# Patient Record
Sex: FEMALE | Race: WHITE
[De-identification: names, ages, dates, MRNs, and addresses within clinical notes are randomized per-mention and may not be internally consistent; named-entity substitution may affect disease eponyms.]

---

## 2017-03-13 ENCOUNTER — HOSPITAL ENCOUNTER (EMERGENCY)
Dept: HOSPITAL 80 - CED | Age: 82
Discharge: HOME | End: 2017-03-13
Payer: COMMERCIAL

## 2017-03-13 VITALS
RESPIRATION RATE: 18 BRPM | TEMPERATURE: 98.1 F | OXYGEN SATURATION: 98 % | SYSTOLIC BLOOD PRESSURE: 166 MMHG | HEART RATE: 70 BPM | DIASTOLIC BLOOD PRESSURE: 69 MMHG

## 2017-03-13 DIAGNOSIS — M75.31: ICD-10-CM
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
## 2017-03-13 DIAGNOSIS — S46.911A: Primary | ICD-10-CM

## 2017-03-13 PROCEDURE — 73030 X-RAY EXAM OF SHOULDER: CPT

## 2017-03-13 NOTE — UCPHY
H & P


Patient Type: Established


HPI/ROS: 





HPI





CHIEF COMPLAINT:  Right shoulder pain





HISTORY OF PRESENT ILLNESS:  this patient very pleasant 85-year-old female 

significant past medical history for atrial fibrillation, pacemaker, chronic 

kidney disease, hypertension, wheelchair bound due to his severe significant 

arthritis, generalized weakness, presents to the urgent care with right 

shoulder pain she tells me about 2 weeks ago she over extended her right arm.  

Her arm was pulled backwards.  Since then she has had intermittent right 

shoulder pain. At times it gets better but however at times gets worse.  She 

tells me the past 24 hours she has anterior right shoulder pain that is worse.  

She thinks she may have re-injured it.  She is able to adduct it however has 

trouble when she abducts it.  This patient tells me that she does have 

available oxycodone however is not take anything for pain. She cannot take 

NSAIDs due to underlying kidney disease.





Past Medical History:  Hypertension, AFib, pacemaker, severe arthritis, 

sedentary, wheelchair-bound, chronic kidney disease, hypertension on Coumadin





Past Surgical History:  Bilateral knee replacements, hip surgery, pacemaker





Social History:   denies use of drugs alcohol tobacco products.  Wheelchair 

bound.   at bedside.





Family History:  Noncontributory








ROS   


REVIEW OF SYSTEMS:


A comprehensive 10 point review of systems is otherwise negative aside from 

elements mentioned in the history of present illness.








Exam   


Constitutional   triage nursing summary reviewed, vital signs reviewed, awake/

alert. 


Eyes   normal conjunctivae and sclera, EOMI, PERRLA. 


HENT   normal inspection, atraumatic, moist mucus membranes, no epistaxis, neck 

supple/ no meningismus, no raccoon eyes. 


Respiratory   clear to auscultation bilaterally, normal breath sounds, no 

respiratory distress, no wheezing. 


Cardiovascular   rate normal, regular rhythm, no murmur, no edema, distal 

pulses normal. 


Gastrointestinal   soft, non-tender, no rebound, no guarding, normal bowel 

sounds, no distension, no pulsatile mass. 


Genitourinary   no CVA tenderness. 


Musculoskeletal on exam of the right shoulder she distally neurovascular intact 

good  strength, good cap refill, good pulse, good radial pulse, able to  

ADDuct her shoulder, however with abduction she develops anterior right 

shoulder pain consistent with musculoskeletal injury possible rotator cuff 

injury, versus deltoid tear, no midline vertebral tenderness, full range of 

motion, no calf swelling, no tenderness of extremities, no meningismus, good 

pulses, neurovascularly intact.


Skin   pink, warm, & dry, no rash, skin atraumatic. 


Neurologic   awake, alert and oriented x 3, AAOx3, moves all 4 extremities 

equally, motor intact, sensory intact, CN II-XII intact, normal cerebellar, 

normal vision, normal speech. 


Psychiatric   normal mood/affect. 


Heme/Lymph/Immune   no lymphadenopathy.





Differential Diagnosis: includes but is not limited to in a particular, 

shoulder strain, musculoskeletal injury, arthritis, humeral neck fracture, 

dislocation relocation dislocation, rotator cuff injury, deltoid injury





Medical Decision Making: plan for this patient had x-ray right shoulder Norco 

for pain control.  Do recommend close orthopedic follow-up as she may have a 

rotator cuff injury. May need steroid injection.





Re-evaluation:





ED x-ray right shoulder: Shows tendinopathy in arthritis.  No acute fracture.  

Image interpreted by myself.





1650:   Patient is resting comfortably here in the  urgent care,  x-ray 

reviewed shows tendinopathy arthritis.  Will place patient on Norco.  She needs 

to follow up with Orthopedics.  May need injection.  She is agreeable with this.





Source: Patient





- Medical/Surgical History


Hx Asthma: No


Hx Chronic Respiratory Disease: No


Hx Diabetes: No


Hx Cardiac Disease: Yes


Hx Renal Disease: Yes


Hx Cirrhosis: No


Hx Alcoholism: No


Hx HIV/AIDS: No


Hx Splenectomy or Spleen Trauma: No


Other PMH: 1. HTN.  2. HYPERLIP.  3. AFIB status post PACEMAKER.  4. 

NEUROPATHY.  5. OSTEOSPOROSIS.  6. AUTONOMIC DYSFX.  7. CHRONIC KIDNEY DZ STG 4,

.  8. gall bladder removal.  9. fundiplacation.  10. GOUT.  11. DIVERTICULITIS.

  12. Orthopedic surgeries including a right hip arthroplasty and right knee 

arthroplasty





- Family History


Significant Family History: No pertinent family hx





- Social History


Smoking Status: Never smoked


Constitutional: 


 Initial Vital Signs











Temperature (C)  36.7 C   03/13/17 15:46


 


Heart Rate  70   03/13/17 15:46


 


Respiratory Rate  18   03/13/17 15:46


 


Blood Pressure  166/69 H  03/13/17 15:46


 


O2 Sat (%)  98   03/13/17 15:46








 











O2 Delivery Mode               Room Air














Allergies/Adverse Reactions: 


 





NSAIDS (Non-Steroidal Anti-Inflamma [Nsaids] Allergy (Unknown, Verified 12/26/ 16 13:05)


 RELATED TO KIDNEY DISEASE


IV Contrast Allergy (Uncoded 12/21/16 14:04)


 








Home Medications: 














 Medication  Instructions  Recorded


 


Aspirin [Aspirin 81mg (*)] 81 mg PO DAILY@18 08/23/14


 


Atenolol [Tenormin 50 mg (*)] 50 mg PO BID 08/23/14


 


Calcium Carb/Vit D3/Minerals 500 mg PO DAILY 08/23/14





[Calcium 1,200 mg Tablet Chew]  


 


Gabapentin [Neurontin 100 MG (*)] 300 mg PO BID 08/23/14


 


Lisinopril [Zestril 10 mg (*)] 10 mg PO BID 08/23/14


 


amLODIPine BESYLATE [Norvasc 5 mg 5 mg PO DAILY@12 08/23/14





(*)]  


 


Herbals/Supplements -Info Only 1 ea PO DAILY 12/27/14


 


Warfarin Sodium [Coumadin 5MG (*)] 5 mg PO TUFR@16 12/27/14


 


ALPRAZolam [Xanax 0.5 MG (*)] 0.5 mg PO HS 04/20/16


 


Denosumab [Prolia] 60 mg SQ Q180D 04/20/16


 


Cholecalciferol Vit D3 [Vitamin D3 2,000 units PO DAILY 12/21/16





(*)]  


 


Warfarin Sodium [Coumadin 2.5MG 2.5 mg PO SUMOWETHSA 12/21/16





(*)]  


 


Docusate Sodium [Colace 100 MG (*)] 100 mg PO TID #30 cap 12/26/16


 


Hydrocodone/APAP 5/325 [Norco 1 - 2 tab PO Q4-6PRN PRN #30 tab 12/26/16





5/325 (*)]  


 


Hydrocodone/APAP 5/325 [Norco 1 - 2 tab PO Q6H PRN #20 tab 03/13/17





5/325 (*)]  














Medical Decision Making





- Data Points


Medications Given: 


 








Discontinued Medications





Hydrocodone Bitart/Acetaminophen (Norco 5/325)  1 tab PO EDNOW ONE


   Stop: 03/13/17 15:50


   Last Admin: 03/13/17 15:54 Dose:  1 tab








Departure





- Departure


Disposition: Home, Routine, Self-Care


Clinical Impression: 


 Tendonitis





Right shoulder strain


Qualifiers:


 Encounter type: initial encounter Qualified Code(s): S46.911A - Strain of 

unspecified muscle, fascia and tendon at shoulder and upper arm level, right arm

, initial encounter





Condition: Good


Instructions:  Rotator Cuff Injury (ED)


Referrals: 


Adin Galloway MD [Primary Care Provider] - As per Instructions


Reece Desouza MD [Medical Doctor] - As per Instructions


Prescriptions: 


Hydrocodone/APAP 5/325 [Norco 5/325 (*)] 1 - 2 tab PO Q6H PRN #20 tab


 PRN Reason: Pain, Mild





- PQRS


PQRS Measurement: 








134:   Depression screening and followup, PRIME MD-PHQ2  (12 years and older)


Over the last 2 weeks, how often have you been bothered by any of the following 

problems? 


 1. Feeling down, depressed, or hopeless?


2. Little interest or pleasure in doing things? 


Patient answered no to both 1 and 2








130:  Documentation of medications.  


Reviewed all patient medications, doses, route and frequency.








226:  Do you smoke?





No.





47:  65 and older: Advanced care planning.


Patient designates surrogate decision maker as Spouse








51:  18 years old and older with diagnosis of COPD, spirometry performance.


Patient has no history of COPD





52:  18 years old and older with COPD and symptoms of COPD or FEV1<60% 

predicted prescribed a B Agonist.


Spirometry not performed; equipment not available.

## 2017-08-18 ENCOUNTER — HOSPITAL ENCOUNTER (OUTPATIENT)
Dept: HOSPITAL 80 - BHCLAF | Age: 82
End: 2017-08-18
Attending: INTERNAL MEDICINE
Payer: COMMERCIAL

## 2017-08-18 DIAGNOSIS — I10: ICD-10-CM

## 2017-08-18 DIAGNOSIS — I48.0: Primary | ICD-10-CM

## 2017-08-18 DIAGNOSIS — Z95.0: ICD-10-CM

## 2017-08-18 DIAGNOSIS — I73.9: ICD-10-CM

## 2017-10-05 ENCOUNTER — HOSPITAL ENCOUNTER (EMERGENCY)
Dept: HOSPITAL 80 - CED | Age: 82
Discharge: HOME | End: 2017-10-05
Payer: COMMERCIAL

## 2017-10-05 VITALS — HEART RATE: 78 BPM | SYSTOLIC BLOOD PRESSURE: 148 MMHG | TEMPERATURE: 98.1 F | DIASTOLIC BLOOD PRESSURE: 87 MMHG

## 2017-10-05 VITALS — RESPIRATION RATE: 18 BRPM

## 2017-10-05 VITALS — OXYGEN SATURATION: 98 %

## 2017-10-05 DIAGNOSIS — Z79.01: ICD-10-CM

## 2017-10-05 DIAGNOSIS — R10.32: Primary | ICD-10-CM

## 2017-10-05 DIAGNOSIS — I12.9: ICD-10-CM

## 2017-10-05 DIAGNOSIS — R19.7: ICD-10-CM

## 2017-10-05 DIAGNOSIS — Z79.82: ICD-10-CM

## 2017-10-05 DIAGNOSIS — Z95.0: ICD-10-CM

## 2017-10-05 DIAGNOSIS — N18.4: ICD-10-CM

## 2017-10-05 LAB
COLOR UR: YELLOW
NITRITE UR QL STRIP: NEGATIVE
PH UR STRIP: 7.5 [PH] (ref 5–7.5)
SP GR UR STRIP: 1.01 (ref 1–1.03)

## 2017-10-05 NOTE — EDPHY
H & P


Stated Complaint: llq abd pain prior to evacuation


Time Seen by Provider: 10/05/17 09:49


HPI/ROS: 





Chief Complaint:  Abdominal pain, changes in bowel movements





HPI:  85-year-old woman history of diverticulosis and chronic constipation is 

been having changes in her bowel movements in the last couple weeks.  For the 

last week or 2 she has had loose stools.  There has been no black stools or 

blood.  Today she had some pain in the left lower quadrant is concerned about 

diverticulitis.  Denies any fevers or chills.  Yesterday afternoon she had 

several episodes of loose stools.  No nausea or vomiting.  No chest pain or 

shortness of breath.  No urinary urgency or frequency.  Pain is about a 3/10.  





ROS:  10 point Review of Systems is negative except as noted in the HPI.





PMH:  Diverticulosis, chronic constipation, renal insufficiency, Nissen 

fundoplication





Social History: No smoking, no alcohol,  no recreational drug use





Family History: non-contributory





Physical Exam:


Gen: Awake, Alert, No Distress


HEENT:  


     Nose: no rhinorrhea


     Eyes: PERRLA, EOMI


     Mouth: Moist mucosa 


Neck: Supple, no JVD


Chest: nontender, lungs clear to auscultation


Heart: S1, S2 normal, no murmur


Abd: Soft, moderate left lower quadrant tenderness without guarding or rebound, 

no guarding


Back: no CVA tenderness, no midline tenderness 


Ext: no edema, non-tender


Skin: no rash


Neuro: CN II-XII intact, Sensation grossly intact, Strength 5/5 in bilateral 

upper and lower extremities








- Medical/Surgical History


Hx Asthma: No


Hx Chronic Respiratory Disease: No


Hx Diabetes: No


Hx Cardiac Disease: Yes


Hx Renal Disease: Yes


Hx Cirrhosis: No


Hx Alcoholism: No


Hx HIV/AIDS: No


Hx Splenectomy or Spleen Trauma: No


Other PMH: 1. HTN.  2. HYPERLIP.  3. AFIB status post PACEMAKER.  4. 

NEUROPATHY.  5. OSTEOSPOROSIS.  6. AUTONOMIC DYSFX.  7. CHRONIC KIDNEY DZ STG 4,

.  8. gall bladder removal.  9. fundiplacation.  10. GOUT.  11. DIVERTICULITIS.

  12. Orthopedic surgeries including a right hip arthroplasty and right knee 

arthroplasty





- Social History


Smoking Status: Never smoked


Constitutional: 


 Initial Vital Signs











Temperature (C)  37.1 C   10/05/17 09:47


 


Heart Rate  73   10/05/17 09:47


 


Respiratory Rate  18   10/05/17 09:47


 


Blood Pressure  111/86 H  10/05/17 09:47


 


O2 Sat (%)  96   10/05/17 09:47








 











O2 Delivery Mode               Room Air














Allergies/Adverse Reactions: 


 





hydrocodone Allergy (Intermediate, Verified 10/05/17 09:42)


 Flushing


NSAIDS (Non-Steroidal Anti-Inflamma [Nsaids] Allergy (Unknown, Verified 10/05/

17 09:42)


 RELATED TO KIDNEY DISEASE


IV Contrast Allergy (Uncoded 12/21/16 14:04)


 








Home Medications: 














 Medication  Instructions  Recorded


 


Aspirin [Aspirin 81mg (*)] 81 mg PO DAILY@18 08/23/14


 


Atenolol [Tenormin 50 mg (*)] 50 mg PO BID 08/23/14


 


Calcium Carb/Vit D3/Minerals 500 mg PO DAILY 08/23/14





[Calcium 1,200 mg Tablet Chew]  


 


Gabapentin [Neurontin 100 MG (*)] 300 mg PO BID 08/23/14


 


Lisinopril [Zestril 10 mg (*)] 10 mg PO BID 08/23/14


 


amLODIPine BESYLATE [Norvasc 5 mg 5 mg PO DAILY@12 08/23/14





(*)]  


 


Herbals/Supplements -Info Only 1 ea PO DAILY 12/27/14


 


Warfarin Sodium [Coumadin 5MG (*)] 5 mg PO TUFR@16 12/27/14


 


ALPRAZolam [Xanax 0.5 MG (*)] 0.5 mg PO HS 04/20/16


 


Denosumab [Prolia] 60 mg SQ Q180D 04/20/16


 


Cholecalciferol Vit D3 [Vitamin D3 2,000 units PO DAILY 12/21/16





(*)]  


 


Warfarin Sodium [Coumadin 2.5MG 2.5 mg PO SUMOWETHSA 12/21/16





(*)]  


 


Docusate Sodium [Colace 100 MG (*)] 100 mg PO TID #30 cap 12/26/16


 


oxyCODONE/APAP 5/325 [Percocet 1 - 2 tab PO Q6H PRN #20 tab 03/13/17





5/325 (*)]  














Medical Decision Making





- Diagnostics


Imaging Results: 


 Imaging Impressions





Abdomen/Pelvis CT  10/05/17 10:00


Impression:


1. Sigmoid diverticulosis, without evidence of diverticulitis.


2. Cholecystectomy.


3. Renal masses show no significant change from 2014 and are thought to be 

cysts.


 


I [place]ph[place] [facility] and left a phone message for Dr. Li at 

1045 hours.


 


Attention:   This examination does not use radiographic contrast, and as such, 

provides only a limited evaluation of the abdomen, pelvis, and retroperitoneum.

  If there is further clinical suspicion for pathological conditions other than 

obstructive uropathy, a complete CT evaluation of the abdomen and pelvis 

utilizing intravenous, oral, and rectal contrast should be considered.


 











Imaging: Discussed imaging studies w/ On call Radiologist


ED Course/Re-evaluation: 





85-year-old with left lower quadrant pain history of diverticulosis with 

changes in her bowel patterns.  She does have some tenderness here.  Will 

obtain a CT scan with noncontrast as she has renal insufficiency and reassess.





CT scan shows no acute process.  She does have diverticulosis but no findings 

suggestive of inflammation.  Patient has mild left lower quadrant tenderness 

with no peritoneal signs whatsoever.  She has a history of C diff colitis in 

the past is very concerned about getting this again.  Given the negative CT 

findings and benign exam I am hesitant to start her on antibiotics at this 

time.  We have discussed at length possibilities for care.  She would prefer to 

wait and follow up with primary care physician.  We have made a appointment for 

her for Monday at 10:30 a.m. with her primary, Dr. Galloway.  She knows that if 

symptoms worsen she will return to the emergency department.





- Data Points


Laboratory Results: 


 











  10/05/17





  11:14


 


Urine Color  Pending 





  


 


Urine Appearance  Pending 





  


 


Urine pH  Pending 





  


 


Ur Specific Gravity  Pending 





  


 


Urine Protein  Pending 





  


 


Urine Ketones  Pending 





  


 


Urine Blood  Pending 





  


 


Urine Nitrate  Pending 





  


 


Urine Bilirubin  Pending 





  


 


Urine Urobilinogen  Pending 





  


 


Ur Leukocyte Esterase  Pending 





  


 


Urine Glucose  Pending 





  














Departure





- Departure


Disposition: Home, Routine, Self-Care


Clinical Impression: 


 Abdominal pain, Diarrhea





Condition: Good


Instructions:  Abdominal Pain (ED), Acute Diarrhea (ED)


Additional Instructions: 


Follow up with Dr. Galloway on Monday.  He have an appointment at 10:30 a.m.


Return to the emergency department for increasing pain, worsening diarrhea, 

nausea, vomiting, blood in her stool or dark black bowel movements, or any 

other concerns.


Referrals: 


Adin Galloway MD [Primary Care Provider] - As per Instructions

## 2018-02-27 ENCOUNTER — HOSPITAL ENCOUNTER (OUTPATIENT)
Dept: HOSPITAL 80 - FED | Age: 83
Setting detail: OBSERVATION
LOS: 1 days | Discharge: HOME HEALTH | End: 2018-02-28
Attending: INTERNAL MEDICINE | Admitting: INTERNAL MEDICINE
Payer: COMMERCIAL

## 2018-02-27 DIAGNOSIS — D72.829: ICD-10-CM

## 2018-02-27 DIAGNOSIS — K21.9: ICD-10-CM

## 2018-02-27 DIAGNOSIS — E78.5: ICD-10-CM

## 2018-02-27 DIAGNOSIS — Z96.643: ICD-10-CM

## 2018-02-27 DIAGNOSIS — I44.7: ICD-10-CM

## 2018-02-27 DIAGNOSIS — M81.0: ICD-10-CM

## 2018-02-27 DIAGNOSIS — M10.9: ICD-10-CM

## 2018-02-27 DIAGNOSIS — Z66: ICD-10-CM

## 2018-02-27 DIAGNOSIS — Z79.01: ICD-10-CM

## 2018-02-27 DIAGNOSIS — I48.91: ICD-10-CM

## 2018-02-27 DIAGNOSIS — G62.9: ICD-10-CM

## 2018-02-27 DIAGNOSIS — F41.9: ICD-10-CM

## 2018-02-27 DIAGNOSIS — G89.29: ICD-10-CM

## 2018-02-27 DIAGNOSIS — R55: Primary | ICD-10-CM

## 2018-02-27 DIAGNOSIS — Z79.82: ICD-10-CM

## 2018-02-27 DIAGNOSIS — N18.3: ICD-10-CM

## 2018-02-27 DIAGNOSIS — I77.9: ICD-10-CM

## 2018-02-27 DIAGNOSIS — I12.9: ICD-10-CM

## 2018-02-27 DIAGNOSIS — Z95.0: ICD-10-CM

## 2018-02-27 DIAGNOSIS — Z96.651: ICD-10-CM

## 2018-02-27 LAB
INR PPP: (no result) (ref 0.83–1.16)
INR PPP: 2.56 (ref 0.83–1.16)
PLATELET # BLD: 468 10^3/UL (ref 150–400)
PROTHROMBIN TIME: (no result) SEC (ref 12–15)
PROTHROMBIN TIME: 27.5 SEC (ref 12–15)

## 2018-02-27 PROCEDURE — 71045 X-RAY EXAM CHEST 1 VIEW: CPT

## 2018-02-27 PROCEDURE — 92610 EVALUATE SWALLOWING FUNCTION: CPT

## 2018-02-27 PROCEDURE — 97165 OT EVAL LOW COMPLEX 30 MIN: CPT

## 2018-02-27 PROCEDURE — 93880 EXTRACRANIAL BILAT STUDY: CPT

## 2018-02-27 PROCEDURE — 97535 SELF CARE MNGMENT TRAINING: CPT

## 2018-02-27 PROCEDURE — G0378 HOSPITAL OBSERVATION PER HR: HCPCS

## 2018-02-27 PROCEDURE — 93306 TTE W/DOPPLER COMPLETE: CPT

## 2018-02-27 PROCEDURE — 93005 ELECTROCARDIOGRAM TRACING: CPT

## 2018-02-27 RX ADMIN — GABAPENTIN SCH MG: 300 CAPSULE ORAL at 21:10

## 2018-02-27 RX ADMIN — GABAPENTIN SCH MG: 300 CAPSULE ORAL at 17:13

## 2018-02-27 NOTE — GHP
[f rep st]



                                                            HISTORY AND PHYSICAL





DATE OF ADMISSION:  2018



CHIEF COMPLAINT:  Syncope.



HISTORY OF PRESENT ILLNESS:  Ms. Aggarwal is a pleasant 86-year-old female with a past medical hi
story of atrial fibrillation, pacemaker placement, and hypertension, who was brought to the [facility] emergency room by EMS after she had had a syncopal episode at home earlier today.  
Her  who was present with her and present at the bedside today states that she was getting shu
ssed and struggling to get her shirt on when she basically went limp and lost consciousness.  Her hus
band states that the symptoms lasted for about 5 minutes.  There was no seizure activity visualized. 
 She did have loss of her urine. Otherwise, over the past weeks, she states that she has been feeling
 fine.  She has not had any upper respiratory illnesses.  No diarrheal illnesses or abdominal pains. 
 She states that she has occasionally once or twice every week or two, felt some lightheadedness when
 walking.  She states a couple months ago her in-office blood pressures were running low and Dr. Missy lazcano did reduce her dose of amlodipine at that point in time.  She has not been having any chest pain
 or breathing difficulties.  No pleuritic-type chest pains either.  I reviewed the case with Dr. Karla lal.  He was able to contact the [facility] to come interrogate her pacemaker to determine
 if any arrhythmias were present at the time of her symptoms.  Dr. Brown is her regular cardiologist
 and she has been good about doing regular pacemaker checks.



PAST MEDICAL HISTORY:  

1.  Atrial fibrillation.

2.  Pacemaker placement. 

3.  Hypertension.  

4.  Chronic kidney disease stage 3. 

5.  Esophageal reflux.



PAST SURGICAL HISTORY:  

1.  Knee replacement x2 on the right. 

2.  Bilateral hip replacements. 

3.  Cholecystectomy. 

4.  Nissen fundoplication.



MEDICATIONS:  This medication list is taken from her ambulatory order list.

1.  Aspirin 81 mg daily.  

2.  Coumadin 5 mg every Monday, Wednesday, and Friday.

3.  Warfarin 2.5 mg on Saturday, , Tuesday, Thursday.

4.  Lisinopril 10 mg twice a day.

5.  Atenolol 50 mg daily.

6.  Amlodipine 2.5 mg daily.

7.  Xanax 0.5 mg nightly.

8.  Prolia.

9.  Gabapentin 300 mg 3 times a day.



ALLERGIES:  NSAIDs related to her history of chronic kidney disease.



FAMILY HISTORY:  Mother and father are both .  Her mother  at the age of 58 secondary to 
colon cancer.  Father  in his late 80s from suspected heart related complications.



SOCIAL HISTORY:  Patient is a nonsmoker.  She has children and grandchildren in the area.  [facility] [place]  Her  or her daughter would be her power of  if unable to make m
edical decisions.  Code status was reviewed, and she is a do not attempt resuscitation code status.



REVIEW OF SYSTEMS:  Constitutional: No recent weight changes.  No fevers or chills. ENT: No recent up
per respiratory illnesses. CARDIOVASCULAR: No complaints of any chest pains, palpitations.  No other 
syncopal episodes other than today.  RESPIRATORY:  No complaints of shortness of breath, productive c
ough for pleuritic-type chest pain.  No hemoptysis.  GASTROINTESTINAL: No nausea, vomiting, diarrhea,
 or constipation.  No focal abdominal pain. GENITOURINARY:  No report of any difficulty with urinatio
n other than the loss of her urine today with a syncopal episode.  NEUROLOGIC:  No history of any sei
zures.  No report of any seizure activity today either.  She notes a twitch of her right foot that st
arted today.  HEMATOLOGIC:  No history of any deep vein thrombosis or pulmonary embolism. PSYCHIATRIC
: No history of anxiety or depression.  She is on benzodiazepine therapy. ENDOCRINE: No history of di
abetes or thyroid abnormalities.. SKIN: No new or concerning skin rashes. MUSCULOSKELETAL: She does h
ave complaints of chronic neck and back pain.



PHYSICAL EXAMINATION:  VITAL SIGNS:  Temperature 36.9, blood pressure is 137/79, heart rate 80, respi
rations 18, saturating 99% on room air. GENERAL: Patient appears comfortable.  Sh is awake, alert, co
nversant, able to provide a good history.  She appears alert and oriented x3.  HEENT:  Extraocular mo
vements appear intact.  No scleral icterus.  Mucous membranes moist. NECK:  No carotid bruit is appre
ciated.  No thyroid enlargement noted.  CHEST:  Clear to auscultation with normal respiratory effort.
  No significant wheezing.  HEART:  Regular.  No murmurs noted.  ABDOMEN:  Soft, nontender, nondisten
ded.  GENITOURINARY: No Westbrook catheter in place. EXTREMITIES: No significant pitting edema. NEUROLOGI
C: Cranial nerves 2 through 12 appear intact. 5/5 strength in upper extremities and 4/5 strength in l
ower extremities.



LABORATORY STUDIES:  White blood cell count 14, hemoglobin 14, platelets 468.  Sodium is 140, potassi
um 4.2, chloride 99, bicarb 25, BUN 16, creatinine is 1.3 with an estimated baseline creatinine somew
here ranging between 1.2 and 1.6.  Glucose level is 132.  D-dimer was 0.60 and her age adjusted D-dim
er cutoff would be 0.86.  .  Troponin less than 0.012.  .  Troponin less than 0.012.



ASSESSMENT/PLAN:  

1.  Syncope.  The patient had an isolated syncopal episode today lasting approximately 5 minutes per 
her .  She has had recent lightheadedness she describes but no actual syncopal episodes.  She 
does have a pacemaker placed and the [facility] has been contacted to come interrogate the
 pacemaker to see if any arrhythmias at the time of her event. Of note, her blood pressure medication
s were lowered recently by her primary care provider, Dr. Galloway.  Her blood pressures here appear 
elevated, but she states this is often the case when she is in the emergency room.  I recommend that 
we trend this closely through the evening time.  I recommended to keep her atenolol and amlodipine at
 the current dosing.  I will try to reduce her lisinopril for now just to 10 mg once a day instead of
 twice a day and we will see what type of blood pressure readings she is obtaining with this regimen.
  Perhaps a dose reduction may be reasonable.  I will check an echocardiogram in the morning.  Consid
er the possibility of a pulmonary embolism.  However, it sounds like she has been appropriately antic
oagulated, but I will confirm this with an INR.  I would not recommend pursuing CT angiography in lig
ht of her chronic kidney disease, but a V/Q scan could be considered, but I think it is relatively lo
w risk at this point in time as she has been saturating normal on room air and no other symptoms to s
uggest pulmonary embolism.

2.  Leukocytosis, possibly reactive at 14.  No fevers noted yet.  She does have mildly elevated white
 blood cell counts noted in the past here at [facility].  We will check a chest x-ray
 and urinalysis.  Monitor for any fevers.  For now, I am not going to start any antibiotic therapy un
less she was to develop a fever.

3.  Atrial fibrillation, rate controlled.  Continue with atenolol and she is anticoagulated with Coum
bon.

4.  Hypertension.  We will dose adjust lisinopril down to 10 mg once a day from twice a day.  Otherwi
se, continue atenolol 50 mg daily and amlodipine 2.5 mg daily.

5.  Chronic kidney disease stage 3.  Her baseline creatinine seems to be between 1.2 and 1.6.  She is
 at 1.3 today.

6.  Deep venous thrombosis prophylaxis.  Heparin.

7.  Disposition.  I will admit her under observation status as if unremarkable workup, she could pote
ntially return home tomorrow.





Job #:  [id number]

## 2018-02-27 NOTE — CPEKG
Heart Rate: 70

RR Interval: 857

P-R Interval: 163

QRSD Interval: 134

QT Interval: 460

QTC Interval: 497

P Axis: 0

QRS Axis: -30

T Wave Axis: 99

EKG Severity - ABNORMAL ECG -

EKG Impression: SINUS RHYTHM

EKG Impression: LEFT BUNDLE BRANCH BLOCK

Electronically Signed By: Bj Avelar 27-Feb-2018 14:09:33

## 2018-02-27 NOTE — EDPHY
H & P


Time Seen by Provider: 18 13:09





- Medical/Surgical History


Hx Asthma: No


Hx Chronic Respiratory Disease: No


Hx Diabetes: No


Hx Cardiac Disease: Yes


Hx Renal Disease: Yes


Hx Cirrhosis: No


Hx Alcoholism: No


Hx HIV/AIDS: No


Hx Splenectomy or Spleen Trauma: No


Other PMH: 1. HTN.  2. HYPERLIP.  3. AFIB status post PACEMAKER.  4. 

NEUROPATHY.  5. OSTEOSPOROSIS.  6. AUTONOMIC DYSFX.  7. CHRONIC KIDNEY DZ STG 4,

.  8. gall bladder removal.  9. fundiplacation.  10. GOUT.  11. DIVERTICULITIS.

  12. Orthopedic surgeries including a right hip arthroplasty and right knee 

arthroplasty





- Social History


Smoking Status: Never smoked


Constitutional: 


 Initial Vital Signs











Temperature (C)  36.9 C   18 13:10


 


Heart Rate  80   18 13:10


 


Respiratory Rate  18   18 13:10


 


Blood Pressure  187/79 H  18 13:10


 


O2 Sat (%)  99   18 13:10








 











O2 Delivery Mode               Room Air














Allergies/Adverse Reactions: 


 





hydrocodone Allergy (Intermediate, Verified 18 13:42)


 Flushing


NSAIDS (Non-Steroidal Anti-Inflamma [Nsaids] Allergy (Unknown, Verified  13:42)


 RELATED TO KIDNEY DISEASE


IV Contrast Allergy (Uncoded 16 14:04)


 








Home Medications: 














 Medication  Instructions  Recorded


 


Aspirin [Aspirin 81mg (*)] 81 mg PO DAILY 14


 


Atenolol [Tenormin 50 mg (*)] 50 mg PO DAILY 14


 


Lisinopril [Zestril 10 mg (*)] 10 mg PO BID 14


 


Herbals/Supplements -Info Only 1 ea PO DAILY 14


 


Warfarin Sodium [Coumadin 5MG (*)] 5 mg PO MWF 14


 


ALPRAZolam [Xanax 0.5 MG (*)] 0.5 mg PO HS 16


 


Denosumab [Prolia] 60 mg SQ Q180D 16


 


Warfarin Sodium [Coumadin 2.5MG 2.5 mg PO SUTUTHSA@16 16





(*)]  


 


C/E/Zn/Cu/OM3/DHA/EPA/LUT/ZEAX 1 each PO DAILY@12 18





[Preservision Areds 2 Softgel]  


 


Gabapentin [Neurontin 300 MG (*)] 300 mg PO TID 18


 


amLODIPine BESYLATE [Norvasc 2.5 2.5 mg PO HS 18





mg (*)]  














Medical Decision Making


ED Course/Re-evaluation: 





CHIEF COMPLAINT: Syncope 





HISTORY OF PRESENT ILLNESS:  





This patient is an 86 year old female with history of atrial fibrillation s/p 

pacer placement presenting following a syncopal episode today. She has history 

of syncope associated with her atrial fibrillation but states she hasn't passed 

out since she had her pacemaker placed. Today, she was sitting after a shower 

and fainted, falling forward. Her  caught her and she did not sustain 

any trauma. She endorses a loss of consciousness of about 5 minutes. She has 

history of osteoporosis and has had several orthopedic procedures including 

cervical spinal surgery and right knee arthroscopy. She has noted a stiff neck 

recently. She usually ambulates with a walker and about 2.5 weeks ago, she had 

an episode where she felt extremely cold and weak but did not faint. At that 

time, she was unable to put any weight on her knee or bend it. The patient is 

unsure whether these episodes are related. She denies fever, chest pain, 

shortness of breath, headache, vision changes, numbness or paresthesias, 

incontinence, or other associated symptoms. 





REVIEW OF SYSTEMS:  





A 10 point review of systems was performed and is negative with the exception 

of the elements mentioned in the history of present illness.





PHYSICAL EXAM:  





HR, BP, O2 Sat, RR.  Temp noted


General Appearance:  Alert, well hydrated, appropriate, and non-toxic appearing.


Head:  Atraumatic without scalp tenderness or obvious injury


Eyes:  Pupils equal, round, reactive to light and accommodation, EOMI, no trauma

, no injection.


Ears:  Clear bilaterally, no perforation, normal landmarks


Nose:  Atraumatic, no rhinorrhea, clear.


Throat:  There is no erythema or exudates, no lesions, normal tonsils, mucus 

membranes moist.


Neck:  Supple, 2+ carotid upstroke, nontender, no lymphadenopathy.


Respiratory:  No retractions, no distress, no wheezes, and no accessory muscle 

use.  Lungs are clear to auscultation bilaterally.


Cardiovascular:  Regular rate and rhythm, no murmurs, rubs, or gallops. 

Bilateral carotid, radial, dorsalis pedis, and posterior tibial pulses intact. 

Good capillary refill all extremities.


Gastrointestinal:  Abdomen is soft, nontender, non-distended, no masses, no 

rebound, no guarding, no peritoneal signs.


Musculoskeletal:  Normal active ROM of all extremities, atraumatic.


Neurological:  Alert, appropriate, and interactive.  The patient has normal 

DTRs and non-focal cranial nerves, motor, sensory, and cerebellar exam.


Skin:  No rashes, good turgor, no nodules on palpation.





Past medical history: Hypertension. Hyperlipidemia. Atrial fibrillation s/p 

pacer placement. Neuropathy. Osteoporosis. Chronic kidney disease. Gout. 

Diverticulitis. 


Past surgical history: Cholecystectomy. Orthopedic surgeries including a right 

hip arthroplasty and right knee arthroplasty


Family history: Noncontributory. 


Social history: Lives in [place]. . Retired. 





DIAGNOSTICS/PROCEDURES/CRITICAL CARE TIME:  





The 12 lead EKG was interpreted by myself. See hard copy and/or "tracemaster" 

electronic copy for interpretation.





DIFFERENTIAL DIAGNOSIS:   





The differential diagnosis for the patient's syncope included but was not 

limited to vasovagal syncope, arrhythmia, dehydration, cardiogenic causes, 

neurogenic causes, and blood loss.





MEDICAL DECISION MAKIN year old female presents following a syncopal episode. Exam is unremarkable, 

no trauma. I offered admission for syncopal episode. The patient initially 

stated she is not interested in admission but after further explaining risks 

and reasons, she is amenable to this. Plan for pacemaker interrogation. Her 

pacer is [employer]. Plan for EKG, labs including CBC, chemistries, 

Troponin, D-dimer, BNP. 





EKG shows sinus rhythm, rate 70. 





Creatinine elevated at 1.3. D-dimer elevated at 0.60. I have low pretest 

probability for PE at this time as the patient presents with syncope, no chest 

pain or shortness of breath. Additionally she has history of recurrent syncopal 

episodes prior to her pacemaker placement. 





14:15 Spoke with Dr. Galvin, internist. He accepts admission to PCU for 

syncope. 





- Data Points


Laboratory Results: 


 Laboratory Results





 18 13:00 





 18 13:00 





 











  18





  13:00 13:00 13:00


 


WBC      14.47 10^3/uL H 10^3/uL





     (3.80-9.50) 


 


RBC      4.93 10^6/uL 10^6/uL





     (4.18-5.33) 


 


Hgb      14.6 g/dL g/dL





     (12.6-16.3) 


 


Hct      43.7 % %





     (38.0-47.0) 


 


MCV      88.6 fL fL





     (81.5-99.8) 


 


MCH      29.6 pg pg





     (27.9-34.1) 


 


MCHC      33.4 g/dL g/dL





     (32.4-36.7) 


 


RDW      12.9 % %





     (11.5-15.2) 


 


Plt Count      468 10^3/uL H 10^3/uL





     (150-400) 


 


MPV      9.1 fL fL





     (8.7-11.7) 


 


Neut % (Auto)      68.6 % %





     (39.3-74.2) 


 


Lymph % (Auto)      17.6 % %





     (15.0-45.0) 


 


Mono % (Auto)      8.4 % %





     (4.5-13.0) 


 


Eos % (Auto)      4.1 % %





     (0.6-7.6) 


 


Baso % (Auto)      0.9 % %





     (0.3-1.7) 


 


Nucleat RBC Rel Count      0.0 % %





     (0.0-0.2) 


 


Absolute Neuts (auto)      9.93 10^3/uL H 10^3/uL





     (1.70-6.50) 


 


Absolute Lymphs (auto)      2.54 10^3/uL 10^3/uL





     (1.00-3.00) 


 


Absolute Monos (auto)      1.21 10^3/uL H 10^3/uL





     (0.30-0.80) 


 


Absolute Eos (auto)      0.60 10^3/uL H 10^3/uL





     (0.03-0.40) 


 


Absolute Basos (auto)      0.13 10^3/uL H 10^3/uL





     (0.02-0.10) 


 


Absolute Nucleated RBC      0.00 10^3/uL 10^3/uL





     (0-0.01) 


 


Immature Gran %      0.4 % %





     (0.0-1.1) 


 


Immature Gran #      0.06 10^3/uL 10^3/uL





     (0.00-0.10) 


 


D-Dimer  0.60 ug/mLFEU H ug/mLFEU    





   (0.00-0.50)   


 


Sodium    140 mEq/L mEq/L  





    (135-145)  


 


Potassium    4.2 mEq/L mEq/L  





    (3.5-5.2)  


 


Chloride    99 mEq/L mEq/L  





    ()  


 


Carbon Dioxide    25 mEq/l mEq/l  





    (22-31)  


 


Anion Gap    16 mEq/L mEq/L  





    (8-16)  


 


BUN    16 mg/dL mg/dL  





    (7-23)  


 


Creatinine    1.3 mg/dL H mg/dL  





    (0.6-1.0)  


 


Estimated GFR    39   





    


 


Glucose    132 mg/dL H mg/dL  





    ()  


 


Calcium    9.6 mg/dL mg/dL  





    (8.5-10.4)  


 


Troponin I    < 0.012 ng/mL ng/mL  





    (0.000-0.034)  


 


NT-Pro-B Natriuret Pep    629 pg/mL H pg/mL  





    (0-450)  














Departure





- Departure


Disposition: [facility] Inpatient Acute


Clinical Impression: 


Syncope


Qualifiers:


 Syncope type: unspecified Qualified Code(s): R55 - Syncope and collapse





Condition: Fair


Report Scribed for: Bj Avelar


Report Scribed by: Shira Dickerson


Date of Report: 18


Time of Report: 15:09

## 2018-02-28 VITALS
HEART RATE: 77 BPM | OXYGEN SATURATION: 95 % | SYSTOLIC BLOOD PRESSURE: 133 MMHG | DIASTOLIC BLOOD PRESSURE: 64 MMHG | RESPIRATION RATE: 15 BRPM

## 2018-02-28 VITALS — TEMPERATURE: 97.4 F

## 2018-02-28 LAB
INR PPP: 2.9 (ref 0.83–1.16)
PLATELET # BLD: 292 10^3/UL (ref 150–400)
PROTHROMBIN TIME: 30.2 SEC (ref 12–15)

## 2018-02-28 RX ADMIN — GABAPENTIN SCH MG: 300 CAPSULE ORAL at 08:49

## 2018-02-28 NOTE — PDIAF
- Diagnosis


Diagnosis: syncope


Code Status: Do Not Resuscitate





- Medication Management


Discharge Medications: 


 Medications to Continue on Transfer





Aspirin [Aspirin 81mg (*)] 81 mg PO DAILY 08/23/14 [Last Taken 02/27/18]


Atenolol [Tenormin 50 mg (*)] 50 mg PO DAILY 08/23/14 [Last Taken 02/27/18]


Herbals/Supplements -Info Only 1 ea PO DAILY 12/27/14 [Last Taken 12/21/16]


Warfarin Sodium [Coumadin 5MG (*)] 5 mg PO MWF 12/27/14 [Last Taken 02/19/18]


ALPRAZolam [Xanax 0.5 MG (*)] 0.5 mg PO HS 04/20/16 [Last Taken 02/26/18]


Denosumab [Prolia] 60 mg SQ Q180D 04/20/16 [Last Taken Unknown]


Warfarin Sodium [Coumadin 2.5MG (*)] 2.5 mg PO SUTUTHSA@16 12/21/16 [Last Taken 

02/20/18]


C/E/Zn/Cu/OM3/DHA/EPA/LUT/ZEAX [Preservision Areds 2 Softgel] 1 each PO DAILY@

12 02/27/18 [Last Taken 02/26/18]


Gabapentin [Neurontin 300 MG (*)] 300 mg PO TID 02/27/18 [Last Taken 02/27/18]


amLODIPine BESYLATE [Norvasc 2.5 mg (*)] 2.5 mg PO HS 02/27/18 [Last Taken 02/26 /18]


Lisinopril [Zestril 10 mg (*)] 10 mg PO DAILY  tab 02/28/18 [Last Taken Unknown]








Discharge Medications: Refer to the Discharge Home Medication list for PRN 

reason.





- Orders


Services needed: Registered Nurse, Physical Therapy, Occupational Therapy


Isolation Type: None





- Follow Up Care


Current Providers and Referrals: 


Patient,NotPresent [Unknown] - As per Instructions

## 2018-02-28 NOTE — ASDISCHSUM
----------------------------------------------

Discharge Information

----------------------------------------------

Plan Status:Home with Home Health                    Medically Cleared to Leave:02/27/2018

Discharge Date:02/28/2018 02:15 PM                   CM D/C Disposition:

ADT D/C Disposition:Home Health Service              Projected Discharge Date:02/28/2018 12:00 AM

Transportation at D/C:                               Discharge Delay Reason:

Follow-Up Date:02/28/2018 12:00 AM                   Discharge Slot:

Final Diagnosis:

----------------------------------------------

Placement Information

----------------------------------------------

Referral Type:*Home Health Care Services             Referral ID:[id number]

Provider Name:[facility]

Address 1:[address] [place] [address]                  Phone [phone]

Address 2:                                           Fax [phone]

City:[place]                                         Selection Factors:

State:[place]

 

----------------------------------------------

Patient Contact Information

----------------------------------------------

Contact Name:FARIHA                       Relationship:

Address:[address] [place] [address]                             Home [phone]

                                                     Work Phone:

[facility]:[place]                                      Alternate Phone:

State/Zip Code:[place] [address]                              Email:

----------------------------------------------

Financial Information

----------------------------------------------

Financial Class:Medicare

Primary Plan Desc:MEDICARE OUTPATIENT                Primary Plan Number:[id number]

Secondary Plan Desc:KULDIP/MDR SUPPLEMENT              Secondary Plan Number:[id number]

 

 

----------------------------------------------

Assessment Information

----------------------------------------------

----------------------------------------------

LACE

----------------------------------------------

LACE

 

Length of stay for            Answers:  1 day                                 

current admission                                                             

Acuity / Level of             Answers:  Yes                                   

Care: Did the patient                                                         

have an inpatient                                                             

admission?                                                                    

Comorbidities - select        Answers:  History of falls                      

all that apply                                                                

                                        Other                         Notes:  CKD Stage 

                                                                              3, HTN, Pacemaker 

                                                                              placement

# of Emergency department     Answers:  1-2                                   

visits in the last 6                                                          

months                                                                        

Score: 9

 

Date Signed:  02/28/2018 01:24 PM

Electronically Signed By:ERLIN Gifford

 

 

----------------------------------------------

[facility] Initial CM Assessment

----------------------------------------------

Living Arrangements

 

What is your living           Answers:  With Spouse                           

arrangement? Who do you                                                       

live with?                                                                    

Type Of Residence

 

What kind of residence do     Answers:  House                                 

you live in?                                                                  

Discharge Plan Comments

 

Coordination Status           

Comments                      

Notes:

Pts case discussed in morning rounds. Pt is a 87 y/o female admitted for syncope. Pt receives a 

couple of hours a week of caregiving (helps w/ meals, cleaning and ADLs). Therapies have been 

ordered. OT is recommending HC. CM spoke to Dr. Herrera. Dr. Herrera is recommending HC. Referral 

made to [facility]. [facility] will be able to follow. CM provided RAZA Osman w/ phone number to give 

report. Pt reports that her  will be able to pick her up. CM available for changes. 



Plan: [facility] alongside private duty non skilled HC

 

Date Signed:  02/28/2018 01:23 PM

Electronically Signed By:ERLIN Gifford

 

 

----------------------------------------------

Intervention Information

----------------------------------------------

Intervention Type:*COLLIN-Signed                       Date of Service:02/28/2018 11:31 AM

Patient Type:Observation                             Staff Member:[place] Yun

Hours:                                               Discipline:

Severity:                                            Comment:

## 2018-02-28 NOTE — HOSPPROG
Hospitalist Progress Note


Assessment/Plan: 





85 yo F w syncope


likely orthostatic


decrease lisinopril to daily


home today


see dc summary


Subjective: no events telemetry.  anxious for dc


Objective: 


 Vital Signs











Temp Pulse Resp BP Pulse Ox


 


 36.3 C   77   15   133/64 H  95 


 


 02/28/18 11:08  02/28/18 11:08  02/28/18 11:08  02/28/18 11:08  02/28/18 11:08








 Laboratory Results





 02/28/18 03:40 





 02/28/18 03:40 





 











 02/27/18 02/28/18 03/01/18





 05:59 05:59 05:59


 


Intake Total  400 200


 


Output Total  400 


 


Balance  0 200








 











PT  30.2 SEC (12.0-15.0)  H  02/28/18  03:40    


 


INR  2.90  (0.83-1.16)  H  02/28/18  03:40    














- Physical Exam


Constitutional: no apparent distress, appears nourished


Eyes: PERRL, anicteric sclera


Ears, Nose, Mouth, Throat: moist mucous membranes, hearing normal


Cardiovascular: regular rate and rhythym, no murmur, rub, or gallop, No 

systolic murmur


Respiratory: no respiratory distress, no rales or rhonchi


Gastrointestinal: normoactive bowel sounds, soft, non-tender abdomen


Genitourinary: no bladder fullness


Skin: warm


Musculoskeletal: full muscle strength


Neurologic: AAOx3





ICD10 Worksheet


Patient Problems: 


 Problems











Problem Status Onset


 


Syncope Acute  


 


Back pain Acute  


 


C. difficile diarrhea Acute  04/25/16


 


Chest pain Acute  


 


Elbow laceration Acute  


 


Fall Acute  


 


Knee pain, acute Acute  


 


Multiple contusions Acute  


 


A-fib Chronic

## 2018-02-28 NOTE — GDS
[f rep st]



                                                             DISCHARGE SUMMARY





DISCHARGE DIAGNOSES:  

1.  Syncope, likely vasovagal.

2.  History of atrial fibrillation with pacer.

3.  Pacemaker placement.

4.  Non-flow limiting carotid vascular disease.

5.  Osteoarthritis with chronic joint pain. 

6.  Stage 3 chronic kidney disease.

7.  Reflux.  



Please see admission history and physical by Dr. Robert Galvin.  The patient presented with syncope. 
 She had a nonischemic EKG negative.  She had a therapeutic INR, so PE was felt unlikely.  Her EKG sh
owed a left bundle branch block pattern, which was not new.  She had negative troponin.  She had a pa
david rhythm.  Her pacer was interrogated, showing about a half an hour of atrial fibrillation with a r
ate of 100 yesterday.  She had several less than 10-second episodes of nonsustained ventricular tachy
cardia.  She has an intact ejection fraction.  An echocardiogram showed diastolic dysfunction only, w
ithout significant valvular lesions. 



This is consistent with a negative syncope workup, and she is discharged home.  Notable is blood pres
sures that are sometimes on the low side.  Dr. Galvin decreased her lisinopril to 10 daily from 10 b.
i.d., and I recommended continuing this, and I have discussed this with her.  I will arrange home PT,
 OT and RN for her.





Job #:  [id number]

## 2018-02-28 NOTE — ASMTLACE
LACE

 

Length of stay for            Answers:  1 day                                 

current admission                                                             

Acuity / Level of             Answers:  Yes                                   

Care: Did the patient                                                         

have an inpatient                                                             

admission?                                                                    

Comorbidities - select        Answers:  History of falls                      

all that apply                                                                

                                        Other                         Notes:  CKD Stage 

                                                                              3, HTN, Pacemaker 

                                                                              placement

# of Emergency department     Answers:  1-2                                   

visits in the last 6                                                          

months                                                                        

Score: 9

 

Date Signed:  02/28/2018 01:24 PM

Electronically Signed By:ERLIN Gifford

## 2018-02-28 NOTE — ECHO
[email or web address]





[address] [address] 

Main: [phone] 



Fax: 



Transthoracic Echocardiogram 

Name:             DAMIEN TOBIAS                    MR#:

[id number]

Study Date:       2018                            Study Time:

08:12 AM

YOB: 1931                            Age:

86 year(s)

Height:           162.6 cm (64 in.)                     Weight:

70.31 kg (155 lb.)

BSA:              1.76 m2                               Gender:

Female

Examination:      Echo                                  Indication:

Cardiac: syncope, hx pacer

Image Quality:                                          Contrast: 

Requested by:     Robert Galvin                         BP:

/

Heart Rate:                                             Rhythm: 

Indication:       Cardiac: syncope, hx pacer 



Procedure Staff 

Ultrasound Technician:   Lorena Damon RDCS 

Reading Physician:       Adams Solorzano MD 

Requesting Provider: 

Ultrasound Technician: 

Reading Physician: 

Requesting Provider: 



Conclusions:          Normal size left ventricle.  

No LV hypertrophy.  

Global hypercontractility of the left ventricle.  

The ejection fraction is estimated to be 65-70 %.  

Normal diastolic LV function.  

Abnormal septal motion most likely secondary to pacemaker/bundle

branch..

Normal size right ventricle.  

There is a pacemaker lead noted in the right ventricle.  

Trivial mitral valve regurgitation.  

Trivial aortic valve regurgitation.  

The pulmonary artery pressure is normal. 



Measurements: 

Chambers                    Valvular Assessment AV/MV

Valvular Assessment TV/PV



Normal                                   Normal

Normal

Name         Value     Range              Name         Value Range

Name          Value Range

Ao Keshia (MM): 3.4 cm    (2.2 cm-3.7            AV Vmax:     0.79 m/s (1

m/s-1.7       TR Vmax:      2.43 mm/s ( - )



cm)                                  m/s)             TR PGmax:     24

mmHg ( - )

IVSd (2D):   0.8 cm (0.6 cm-1.1               AV maxP mmHg ( -

)          syst. PAP: 29 mmHg  ( - )



cm)                MV E Vmax:   0.52 m/s ( - )  

LVDd (2D):   4.3 cm    (3.9 cm-5.3            MV A Vmax:   0.68 m/s (

- )



cm)                MV E/A:      0.76 ( - )  

LVDs (2D):   3.1 cm    (2.1 cm-4 



cm)   

LVPWd (2D):  0.8 cm    ( - )   

LVEF (MOD4): 66 %      (>=55 %)   



Patient: DAMIEN TOBIAS                   MRN: [id number]

Study Date: 2018   Page 1 of 2

08:12 AM 









EF Range: 65-70 % 



Continued Measurements: 

Chambers                      Valvular Assessment AV/MV

Valvular Assessment TV/PV



Name                       Value          Name

Value    Name                      Value

LADs:                   4.0 cm                MV E/E' Septal:

12.90     CVP (est.):             5 mmHg

LADs Lon.3 cm                MV E/E' Lateral:

10.20

LA Area:                16.2 cm2   



Findings:             Left Ventricle: 

Normal size left ventricle. No LV hypertrophy. Global

hypercontractility of the left ventricle. The

ejection fraction is estimated to be 65-70 %. Normal diastolic LV

function. Abnormal septal motion

most likely secondary to pacemaker/bundle branch..  

Right Ventricle: 

Normal size right ventricle. There is a pacemaker lead noted in the

right ventricle.

Left Atrium: 

The left atrium is normal in size.  

Right Atrium: 

The right atrium is normal in size.  

Mitral Valve: 

Mild mitral annular calcification. Trivial mitral valve regurgitation.



Aortic Valve: 

Minimal aortic cusp calcification is noted. Trivial aortic valve

regurgitation.

Tricuspid Valve: 

The tricuspid valve is normal in appearance and function. Mild

tricuspid regurgitation is present. The

pulmonary artery pressure is normal.  

Pulmonic Valve: 

Pulmonary valve not well visualized. Trivial pulmonic valve

regurgitation.

Aorta: 

The aorta is normal.  

Pericardium: 

No pericardial effusion. There is pericardial fat. 







Electronically signed by Adams Solorzano MD on 2018 at 10:30 AM 

(No Signature Object) 



Patient: DAMIEN TOBIAS                   MRN: [id number]

Study Date: 2018   Page 2 of 2

08:12 AM 







[id number]

## 2018-02-28 NOTE — ASMTCASEMG
Living Arrangements

 

What is your living           Answers:  With Spouse                           

arrangement? Who do you                                                       

live with?                                                                    

Type Of Residence

 

What kind of residence do     Answers:  House                                 

you live in?                                                                  

Discharge Plan Comments

 

Coordination Status           

Comments                      

Notes:

Pts case discussed in morning rounds. Pt is a 87 y/o female admitted for syncope. Pt receives a 

couple of hours a week of caregiving (helps w/ meals, cleaning and ADLs). Therapies have been 

ordered. OT is recommending HC. CM spoke to Dr. Herrera. Dr. Herrera is recommending HC. Referral 

made to [facility]. [facility] will be able to follow. CM provided RAZA Osman w/ phone number to give 

report. Pt reports that her  will be able to pick her up. CM available for changes. 



Plan: [facility] alongside private duty non skilled HC

 

Date Signed:  02/28/2018 01:23 PM

Electronically Signed By:ERLIN Gifford

## 2018-03-13 ENCOUNTER — HOSPITAL ENCOUNTER (EMERGENCY)
Dept: HOSPITAL 80 - CED | Age: 83
Discharge: HOME | End: 2018-03-13
Payer: COMMERCIAL

## 2018-03-13 VITALS — HEART RATE: 84 BPM | TEMPERATURE: 98.1 F | RESPIRATION RATE: 20 BRPM | OXYGEN SATURATION: 96 %

## 2018-03-13 DIAGNOSIS — Z79.01: ICD-10-CM

## 2018-03-13 DIAGNOSIS — Z79.82: ICD-10-CM

## 2018-03-13 DIAGNOSIS — M54.2: Primary | ICD-10-CM

## 2018-03-13 NOTE — EDPHY
H & P


Time Seen by Provider: 03/13/18 10:43


HPI/ROS: 





CHIEF COMPLAINT:  Neck pain





History by patient





HISTORY OF PRESENT ILLNESS:  86-year-old woman with multiple medical problems 

and longstanding history of "neck and spine issues"per the patient presents 

complaining of ongoing neck pain and stiffness.  She localized pain to her 

lower C-spine and says it hurts more when she rotates her head side to side.  

She feels that this pain which she has had chronically for many years and in 

fact saw a neurosurgeon for about 15 years ago has been worse over the past 

month shin she spent a long visit with her daughter with her head turned to the 

right so she could see her while they were talking.  Patient states that she is 

very sedentary and then get up and around months.  Patient was seen in the 

hospital for syncopal episode 1 week ago was discharged home with physical 

therapy to increase her mobility which she says she has been doing.  She has 

also been newly using a forearm walker with physical therapist which she says 

does exacerbate of the discomfort in her neck and the muscles around her 

shoulders.  There was no trauma when she had the syncopal episode she did not 

hit her head or neck.  She denies any recent trauma since the neck pain has 

worsened.  There has also been no fever.  The patient denies any focal numbness 

tingling or shooting pains down her arms.  She says she has had an MRI of her 

neck but the past before she had her pacemaker placed in 2013. She has a 

history of compression fractures in her T-spine.  She does feel that this neck 

pain is typical for her ongoing chronic neck symptoms.  She takes Tylenol with 

some relief and uses ice with some relief.  





REVIEW OF SYSTEMS:


As in HPI, and all other systems reviewed and are negative


Smoking Status: Never smoked


Physical Exam: 





General Appearance:  Alert, elderly, pleasant.


Head:  Normocephalic, atraumatic


Eyes:  Pupils equal and round, no pallor or injection.


ENT, Mouth:  Mucous membranes moist.


Neck:  Mild bony tenderness over lower C-spine and upper T-spine, somewhat 

limited range of motion due to pain when she rotates her head either right or 

left, full flexion, limited extension due to pain, positive tenderness along 

trapezius muscles bilaterally


Neurological:  Awake, alert and oriented x 3, no pronator drift, normal gait, 

no pronator drift, radial, median and ulnar nerve intact motor and sensory in 

bilateral hands


Back:  No bony tenderness, positive kyphosis


Skin:  Warm and dry, no rashes.


Musculoskeletal:  Neck is supple, FROM, nontender.


Extremities: symmetrical, full range of motion.


Psychiatric:  Patient has normal affect, there is no agitation.


Constitutional: 


 Initial Vital Signs











Temperature (C)  36.7 C   03/13/18 10:46


 


Heart Rate  84   03/13/18 10:46


 


Respiratory Rate  20   03/13/18 10:46


 


O2 Sat (%)  96   03/13/18 10:46








 











O2 Delivery Mode               Room Air














Allergies/Adverse Reactions: 


 





hydrocodone Allergy (Intermediate, Verified 03/13/18 10:51)


 Flushing


NSAIDS (Non-Steroidal Anti-Inflamma [NSAIDS (Non-Steroidal Anti-Inflammatory 

Drug)] Allergy (Unknown, Verified 03/13/18 10:51)


 RELATED TO KIDNEY DISEASE


IV Contrast Allergy (Uncoded 12/21/16 14:04)


 








Home Medications: 














 Medication  Instructions  Recorded


 


Aspirin [Aspirin 81mg (*)] 81 mg PO DAILY 08/23/14


 


Atenolol [Tenormin 50 mg (*)] 50 mg PO DAILY 08/23/14


 


Herbals/Supplements -Info Only 1 ea PO DAILY 12/27/14


 


Warfarin Sodium [Coumadin 5MG (*)] 5 mg PO MWF 12/27/14


 


ALPRAZolam [Xanax 0.5 MG (*)] 0.5 mg PO HS 04/20/16


 


Denosumab [Prolia] 60 mg SQ Q180D 04/20/16


 


Warfarin Sodium [Coumadin 2.5MG 2.5 mg PO SUTUTHSA@16 12/21/16





(*)]  


 


C/E/Zn/Cu/OM3/DHA/EPA/LUT/ZEAX 1 each PO DAILY@12 02/27/18





[Preservision Areds 2 Softgel]  


 


Gabapentin [Neurontin 300 MG (*)] 300 mg PO TID 02/27/18


 


amLODIPine BESYLATE [Norvasc 2.5 2.5 mg PO HS 02/27/18





mg (*)]  


 


Lisinopril [Zestril 10 mg (*)] 10 mg PO DAILY  tab 02/28/18














MDM/Departure





- [facility]


ED Course/Re-evaluation: 





86-year-old elderly woman with chronic neck pain which is somewhat exacerbated 

over the past month but with no evidence of nerve involvement.  There is also 

no history of trauma.  The patient has palpable muscle tenderness as well.  

Although I cannot exclude a new cervical spine compression fracture, we did 

discuss imaging at this point the patient does not want an x-ray.  She is not a 

surgical candidate and states she does not want surgery.  I am recommending 

ongoing symptomatic treatment with acetaminophen and icing and heat as needed, 

and consideration of massage.  I recommended she discuss the neck involvement 

with her physical therapist as increasing her mobility overall may increase her 

mobility in her neck.  I also recommend close follow-up with her primary care 

physician to recheck how she is doing in 1-2 weeks and consider imaging if her 

symptoms seem to be getting worsening or are not well controlled.  Patient and 

her  understand and are agreeable to this plan.





- Depart


Disposition: Home, Routine, Self-Care


Clinical Impression: 


 Neck pain without injury





Condition: Fair


Instructions:  Chronic Neck Pain (DC)


Additional Instructions: 


You were seen by Dr. Dilcia Briceno today.





I recommend acetaminophen (Tylenol) 650 mg 4 times daily or if this is 

inadequate try 1000 mg 4 times daily.  Continue icing as you had been and try 

also heat pads.  I recommend trying some massage.  Working with her physical 

therapist to increase her mobility will also likely improve the mobility of 

your neck.  


If your symptoms persist or get worse then please follow up with her primary 

care physician and consider x-rays or CT scan of her neck at that time.  I 

recommend following up with the primary care physician in 2-3 weeks to re-

evaluate how your neck is doing in either case.





 Return for any worsening or new concerns.


Referrals: 


Adin Galloway MD [Primary Care Provider] - As per Instructions

## 2018-03-14 ENCOUNTER — HOSPITAL ENCOUNTER (OUTPATIENT)
Dept: HOSPITAL 80 - CED | Age: 83
Setting detail: OBSERVATION
LOS: 1 days | Discharge: HOME | End: 2018-03-15
Attending: INTERNAL MEDICINE | Admitting: INTERNAL MEDICINE
Payer: COMMERCIAL

## 2018-03-14 DIAGNOSIS — K59.00: ICD-10-CM

## 2018-03-14 DIAGNOSIS — Z96.651: ICD-10-CM

## 2018-03-14 DIAGNOSIS — I12.9: ICD-10-CM

## 2018-03-14 DIAGNOSIS — Z79.01: ICD-10-CM

## 2018-03-14 DIAGNOSIS — Z96.643: ICD-10-CM

## 2018-03-14 DIAGNOSIS — I48.91: ICD-10-CM

## 2018-03-14 DIAGNOSIS — M81.0: ICD-10-CM

## 2018-03-14 DIAGNOSIS — N18.4: ICD-10-CM

## 2018-03-14 DIAGNOSIS — Z95.0: ICD-10-CM

## 2018-03-14 DIAGNOSIS — Z86.010: ICD-10-CM

## 2018-03-14 DIAGNOSIS — K92.1: Primary | ICD-10-CM

## 2018-03-14 DIAGNOSIS — K21.9: ICD-10-CM

## 2018-03-14 DIAGNOSIS — E78.5: ICD-10-CM

## 2018-03-14 LAB
INR PPP: 3.05 (ref 0.83–1.16)
PLATELET # BLD: 343 10^3/UL (ref 150–400)
PROTHROMBIN TIME: 30.7 SEC (ref 12–15)

## 2018-03-14 PROCEDURE — G0378 HOSPITAL OBSERVATION PER HR: HCPCS

## 2018-03-14 RX ADMIN — GABAPENTIN SCH MG: 300 CAPSULE ORAL at 23:19

## 2018-03-14 NOTE — GHP
[f rep st]



                                                            HISTORY AND PHYSICAL





DATE OF ADMISSION:  2018



CHIEF COMPLAINT:  Bright red blood per rectum.



HISTORY OF PRESENT ILLNESS:  An 86-year-old female, on warfarin for atrial fibrillation, presents wit
h an episode of bright red blood per rectum.  She had felt constipated.  She had to strain very hard 
to get her stool out.  When she did, she saw blood in the toilet water.  She has never had this befor
e.  Her last colonoscopy was in .  She believes she was told she had hemorrhoids then.  She has s
ome chronic left lower quadrant abdominal pain, which she always thinks is diverticulitis; however ha
s been told that it is not.  She has not had any nausea.  She cannot vomit as she has had a Nissen fu
ndoplication.  She has ongoing problems with constipation, as well as sometimes diarrhea.  [facility] did not find any hemorrhoids or anal fissures.  CT scan reviewed from 
2017 shows sigmoid diverticulosis.  She is not taking any NSAIDs due to her kidneys.  She 
does not drink significant amounts of alcohol.



PAST MEDICAL/SURGICAL HISTORY:  

1.  Atrial fibrillation, status post pacemaker, on chronic anticoagulation.

2.  Hypertension.

3.  Chronic kidney disease, stage 3 or 4.

4.  GERD.

5.  Right TKA.

6.  Bilateral ADRIAN.

7.  Cholecystectomy.

8.  Nissen fundoplication.



MEDICATIONS:  Please see medication reconciliation.



ALLERGIES:  Hydrocodone, NSAIDs, IV contrast.



SOCIAL HISTORY:  She lives with her .  She does not drink or smoke.



FAMILY HISTORY:  Parents are .



REVIEW OF SYSTEMS:  A 10-point review of systems is conducted and is negative except per HPI.



PHYSICAL EXAMINATION:  VITAL SIGNS:  Blood pressure 179/95, heart rate 70, respiration rate 17, satur
ating 95% on room air, temperature 36.8.  IN GENERAL:  The patient is a pleasant female who is restin
g comfortably.  No acute distress.  HEENT:  Shows her to be normocephalic, atraumatic.  CARDIOVASCULA
R:  Regular rate and rhythm.  No murmurs, rubs, or gallops.  PULMONARY:  Lungs clear to auscultation 
bilaterally.  ABDOMEN:  Some left lower quadrant tenderness to palpation.  There is no guarding or re
bound.  This is non-peritoneal.  She has normal bowel sounds.  SKIN:  No rash.  :  No [place].  NEURO
LOGIC:  Shows her to be alert and oriented x3.  She is moving all extremities.  PSYCHIATRIC:  Normal 
mood and affect.



LABS:  White count is 9.6.  INR is 3.0.  Creatinine is 1.1.  Otherwise, basic metabolic panel is norm
al.



DATA:  

1.  I discussed this with Dr. Briceno.  We will admit to Med/Surg.

2.  I reviewed her CT abdomen as above, showing sigmoid diverticulosis.



IMPRESSION AND PLAN:  

1.  Bright red blood per rectum:  Unsure if this is hemorrhoids or diverticular.  It is self-limited.
  Her INR is elevated.  Given her hemodynamic stability and lack of ongoing bleeding, I do not think 
she needs to be reversed at this time.  We will hold her warfarin.  If she has more bleeding, would c
ertainly reverse her.  GI has already been consulted by the ED.

2.  Atrial fibrillation, status post pacemaker:  We will hold her warfarin as above.

3.  Hypertension:  She is somewhat hypertensive now.  Would gently restart her antihypertensives give
n the clinical situation.

4.  Code status:  She would like to be a Do Not Resuscitate.

5.  VTE risk is moderate to high.  However, with lower GI bleeding, we will hold any prophylaxis.





Job #:  [id number]

## 2018-03-14 NOTE — EDPHY
H & P


Stated Complaint: PT. states noticed blood in toilet after bm this am,inr 4 @ 

home today


Time Seen by Provider: 03/14/18 17:55


HPI/ROS: 





CHIEF COMPLAINT:  Bright red blood per rectum





History by patient





HISTORY OF PRESENT ILLNESS:  86-year-old woman on warfarin for AFib presents 

because of passing bright red blood per rectum this morning.  Patient states 

she had difficult bowel movement strain at stool and then when she finished 

there was bright red blood in the bowl.  She says she has not had any ongoing 

bleeding.  She has had some mild lower abdominal discomfort and a feeling of

"bloating".  There has been no nausea, vomiting or hematemesis.  She has not 

eaten today very much because she has been"too busy" calling doctors.  She 

denies any syncope or near syncope.  She has had no chest pain or shortness of 

breath.  Patient checks her INR with a home test in says her INR was 4 today.





REVIEW OF SYSTEMS:


As in HPI, and all other systems reviewed and are negative


Source: Patient





- Medical/Surgical History


Hx Asthma: No


Hx Chronic Respiratory Disease: No


Hx Diabetes: No


Hx Cardiac Disease: Yes


Hx Renal Disease: Yes


Hx Cirrhosis: No


Hx Alcoholism: No


Hx HIV/AIDS: No


Hx Splenectomy or Spleen Trauma: No


Other PMH: 1. HTN.  2. HYPERLIP.  3. AFIB status post PACEMAKER.  4. 

NEUROPATHY.  5. OSTEOSPOROSIS.  6. AUTONOMIC DYSFX.  7. CHRONIC KIDNEY DZ STG 4,

.  8. gall bladder removal.  9. fundiplacation.  10. GOUT.  11. DIVERTICULITIS.

  12. Orthopedic surgeries including a right hip arthroplasty and right knee 

arthroplasty.  13.Cataracts





- Social History


Smoking Status: Never smoked





- Physical Exam


Exam: 





General Appearance:  Alert, elderly, pleasant.


Eyes:  Pupils equal and round, extraocular movements intact, no pallor or 

injection.


Mouth:  Mucous membranes moist. Pharynx clear


Respiratory:  Normal, effort, lungs are clear to auscultation. No wheezes, 

rales or rhonchi.


Cardiovascular:  Regular rate and rhythm. S1, S2, no murmurs, gallops or rubs 

appreciated


Gastrointestinal:  bowel sounds present, Abdomen is soft and mild left lower 

quadrant tenderness, no masses


Rectal:  Normal tone, empty vault with bright red blood on glove, no palpable 

hemorrhoids, no tenderness


Back:  Kyphotic


Neurological:  Awake, alert and oriented x 3, no pronator drift, normal gait, 

no pronator drift


Skin:  Warm and dry, no rashes.


Musculoskeletal:   No deformities or tenderness.


Extremities: full range of motion, no edema


Psychiatric:  Patient has normal affect, there is no agitation.


Constitutional: 


 Initial Vital Signs











Temperature (C)  36.7 C   03/14/18 17:48


 


Heart Rate  73   03/14/18 17:48


 


Respiratory Rate  16   03/14/18 17:48


 


Blood Pressure  174/104 H  03/14/18 17:48


 


O2 Sat (%)  96   03/14/18 17:48








 











O2 Delivery Mode               Room Air














Allergies/Adverse Reactions: 


 





hydrocodone Allergy (Intermediate, Verified 03/14/18 17:45)


 Flushing


NSAIDS (Non-Steroidal Anti-Inflamma [NSAIDS (Non-Steroidal Anti-Inflammatory 

Drug)] Allergy (Unknown, Verified 03/14/18 17:45)


 RELATED TO KIDNEY DISEASE


IV Contrast Allergy (Uncoded 03/14/18 17:45)


 








Home Medications: 














 Medication  Instructions  Recorded


 


Aspirin [Aspirin 81mg (*)] 81 mg PO DAILY 08/23/14


 


Atenolol [Tenormin 50 mg (*)] 50 mg PO DAILY 08/23/14


 


Herbals/Supplements -Info Only 1 ea PO DAILY 12/27/14


 


Warfarin Sodium [Coumadin 5MG (*)] 5 mg PO MWF 12/27/14


 


ALPRAZolam [Xanax 0.5 MG (*)] 0.5 mg PO  04/20/16


 


Denosumab [Prolia] 60 mg SQ Q180D 04/20/16


 


Warfarin Sodium [Coumadin 2.5MG 2.5 mg PO SUTUTHSA@16 12/21/16





(*)]  


 


C/E/Zn/Cu/OM3/DHA/EPA/LUT/ZEAX 1 each PO DAILY@12 02/27/18





[Preservision Areds 2 Softgel]  


 


Gabapentin [Neurontin 300 MG (*)] 300 mg PO TID 02/27/18


 


amLODIPine BESYLATE [Norvasc 2.5 2.5 mg PO HS 02/27/18





mg (*)]  


 


Lisinopril [Zestril 10 mg (*)] 10 mg PO DAILY  tab 02/28/18


 


Areds  03/14/18


 


Calcium Chews  03/14/18














Medical Decision Making


ED Course/Re-evaluation: 





86-year-old woman on Coumadin presents with apparent lower GI bleed.  Patient 

is hemodynamically stable.  Her initial hemoglobin is within normal limits.  

Her INR here is 3. Given her age, her anticoagulated state with possible 

ongoing bleeding because of the blood in her vault she will be admitted for 

further evaluation treatment.  I discussed the case with Dr. Milton Pierce, 

hospitalist on call.  I discussed the case with Dr. Anderson, on-call for GI.  





- Data Points


Laboratory Results: 


 Laboratory Results





 03/14/18 18:15 





 03/14/18 18:15 





 











  03/14/18 03/14/18 03/14/18





  18:15 18:15 18:15


 


WBC      9.65 10^3/uL H 10^3/uL





     (3.80-9.50) 


 


RBC      4.76 10^6/uL 10^6/uL





     (4.18-5.33) 


 


Hgb      13.8 g/dL g/dL





     (12.6-16.3) 


 


Hct      40.7 % %





     (38.0-47.0) 


 


MCV      85.5 fL fL





     (81.5-99.8) 


 


MCH      29.0 pg pg





     (27.9-34.1) 


 


MCHC      33.9 g/dL g/dL





     (32.4-36.7) 


 


RDW      13.3 % %





     (11.5-15.2) 


 


Plt Count      343 10^3/uL 10^3/uL





     (150-400) 


 


MPV      8.3 fL L fL





     (8.7-11.7) 


 


Neut % (Auto)      68.9 % %





     (39.3-74.2) 


 


Lymph % (Auto)      20.6 % %





     (15.0-45.0) 


 


Mono % (Auto)      6.0 % %





     (4.5-13.0) 


 


Eos % (Auto)      3.6 % %





     (0.6-7.6) 


 


Baso % (Auto)      0.6 % %





     (0.3-1.7) 


 


Nucleat RBC Rel Count      0.0 % %





     (0.0-0.2) 


 


Absolute Neuts (auto)      6.64 10^3/uL H 10^3/uL





     (1.70-6.50) 


 


Absolute Lymphs (auto)      1.99 10^3/uL 10^3/uL





     (1.00-3.00) 


 


Absolute Monos (auto)      0.58 10^3/uL 10^3/uL





     (0.30-0.80) 


 


Absolute Eos (auto)      0.35 10^3/uL 10^3/uL





     (0.03-0.40) 


 


Absolute Basos (auto)      0.06 10^3/uL 10^3/uL





     (0.02-0.10) 


 


Absolute Nucleated RBC      0.00 10^3/uL 10^3/uL





     (0-0.01) 


 


Immature Gran %      0.3 % %





     (0.0-1.1) 


 


Immature Gran #      0.03 10^3/uL 10^3/uL





     (0.00-0.10) 


 


PT    30.7 SEC H SEC  





    (12.0-15.0)  


 


INR    3.05  H   





    (0.83-1.16)  


 


APTT    59.3 SEC H SEC  





    (23.0-38.0)  


 


Sodium  135 mEq/L mEq/L    





   (135-145)   


 


Potassium  4.2 mEq/L mEq/L    





   (3.5-5.2)   


 


Chloride  102 mEq/L mEq/L    





   ()   


 


Carbon Dioxide  22 mEq/l mEq/l    





   (22-31)   


 


Anion Gap  11 mEq/L mEq/L    





   (8-16)   


 


BUN  15 mg/dL mg/dL    





   (7-23)   


 


Creatinine  1.1 mg/dL H mg/dL    





   (0.6-1.0)   


 


Estimated GFR  47     





    


 


Glucose  117 mg/dL H mg/dL    





   ()   


 


Calcium  9.3 mg/dL mg/dL    





   (8.5-10.4)   














Departure





- Departure


Referrals: 


Adin Galloway MD [Primary Care Provider] - As per Instructions

## 2018-03-15 VITALS — SYSTOLIC BLOOD PRESSURE: 171 MMHG | DIASTOLIC BLOOD PRESSURE: 85 MMHG

## 2018-03-15 VITALS — OXYGEN SATURATION: 92 % | HEART RATE: 72 BPM | TEMPERATURE: 98 F

## 2018-03-15 VITALS — RESPIRATION RATE: 16 BRPM

## 2018-03-15 LAB
INR PPP: 2.56 (ref 0.83–1.16)
PLATELET # BLD: 293 10^3/UL (ref 150–400)
PROTHROMBIN TIME: 27.5 SEC (ref 12–15)

## 2018-03-15 RX ADMIN — GABAPENTIN SCH: 300 CAPSULE ORAL at 09:58

## 2018-03-15 RX ADMIN — ACETAMINOPHEN PRN MG: 160 SOLUTION ORAL at 09:18

## 2018-03-15 RX ADMIN — ACETAMINOPHEN PRN MG: 160 SOLUTION ORAL at 13:13

## 2018-03-15 NOTE — GDS
[f rep st]



                                                             DISCHARGE SUMMARY





DISCHARGE DIAGNOSIS:  

1.  Concern for gastrointestinal bleed, bright red blood per rectum. 

2.  Atrial fibrillation, status post pacemaker. 

3.  Hypertension.



BRIEF HISTORY:  Briefly, the patient is an 86-year-old female who presented to the emergency room wit
h bright red blood per rectum.  She is on warfarin for atrial fibrillation.  I reviewed her care with
 Dr. Anderson.  The recommendation is to do serial H and Hs and if she is tolerating a regular diet to
 discharge home.



CONSULTATION:  Dr. Christopher Anderson.



HOSPITAL COURSE:  

1.  Bright red blood per rectum.  Hemoglobin and hematocrit are stable.  I reviewed her CT scan of th
e abdomen which showed sigmoid diverticulosis.  I held her aspirin anticoagulation.  I am recommendin
g she start her warfarin tomorrow.

2.  Atrial fibrillation.  She is status post pacemaker, stable.

3.  Hypertension.  Resumed her Norvasc and lisinopril.



DISCHARGE CONDITION:  Stable. Blood pressure is 158/83, O2 saturations on room air 97%, respiratory r
ate is 16, pulse is 73, temperature is 36.3 Celsius.



MEDICATIONS AT DISCHARGE:  Please see the EMR.



DISCHARGE INSTRUCTIONS:  

1.  The patient suffers from constipation.  Made multiple recommendations with the patient and her da
ughter on good stool regimens.

2.  She is on Coumadin and has had trouble controlling her INR.  Recommended she further follow up [facility] Dr. Brown in regard to this.

3.  If she develops any further bleeding, to return to the ER.





Job #:  [id number]

## 2018-03-15 NOTE — ASMTCMCOM
CM Note

 

CM Note                       

Notes:

Recieved call from Jennifer at [facility], pt is current with them. Pt getting discharged today, no orders 

necessary.



DC Plan: Resume homecare/[facility]

 

Date Signed:  03/15/2018 12:29 PM

Electronically Signed By:Ashlyn Donovan RN

## 2018-03-15 NOTE — HOSPPROG
Hospitalist Progress Note


Assessment/Plan: 





Patient is an 86-year-old female who presented the emergency room with bright 

red blood per rectum.  She is on warfarin for atrial fibrillation.  Today is my 

1st encounter with the patient.  Chart reviewed.





* bright red blood per rectum


-hemoglobin hematocrit are stable


-reviewed her CT scan of the abdomen which shows sigmoid diverticulosis


-reviewed note from GI/ diet as tolerated


-holding aspirin and anticoagulation





* atrial fibrillation status post pacemaker


-on warfarin, this was held today





* hypertension


-resume Norvasc and lisinopril





*plan: dc home after eating, h/h are stable


Subjective: Zabrina is feeling well, her biggest concern is constipation.


Objective: 


 Vital Signs











Temp Pulse Resp BP Pulse Ox


 


 36.6 C   73   16   158/83 H  97 


 


 03/15/18 07:25  03/15/18 07:25  03/15/18 07:25  03/15/18 07:25  03/15/18 07:25








 Laboratory Results





 03/15/18 06:20 





 03/15/18 06:20 





 











 03/14/18 03/15/18 03/16/18





 05:59 05:59 05:59


 


Intake Total  300 


 


Output Total  200 


 


Balance  100 








 











PT  30.7 SEC (12.0-15.0)  H  03/14/18  18:15    


 


INR  3.05  (0.83-1.16)  H  03/14/18  18:15    














- Physical Exam


Constitutional: no apparent distress, appears nourished


Eyes: PERRL


Ears, Nose, Mouth, Throat: hearing normal


Cardiovascular: no murmur, rub, or gallop


Respiratory: no respiratory distress


Skin: warm


Neurologic: AAOx3


Psychiatric: interacting appropriately





ICD10 Worksheet


Patient Problems: 


 Problems











Problem Status Onset


 


Back pain Acute  


 


C. difficile diarrhea Acute  04/25/16


 


Chest pain Acute  


 


Elbow laceration Acute  


 


Fall Acute  


 


Knee pain, acute Acute  


 


Multiple contusions Acute  


 


Syncope Acute  


 


A-fib Chronic

## 2018-03-15 NOTE — ASMTLACE
LACE

 

Length of stay for            Answers:  1 day                                 

current admission                                                             

Acuity / Level of             Answers:  No                                    

Care: Did the patient                                                         

have an inpatient                                                             

admission?                                                                    

Comorbidities - select        Answers:  Coronary Artery Disease               

all that apply                                                                

                                        Mild liver or renal                   

                                        disease                               

                                        Other                         Notes:  HTN, CKD, HLD,

# of Emergency department     Answers:  1-2                                   

visits in the last 6                                                          

months                                                                        

Score: 7

 

Date Signed:  03/15/2018 12:36 PM

Electronically Signed By:Ashlyn Donovan RN

## 2018-03-15 NOTE — GCON
[f rep st]



                                                                    CONSULTATION





CHIEF COMPLAINT:  Hematochezia.



HISTORY OF PRESENT ILLNESS:  I have been asked to see this very pleasant 86-year-old woman in consult
ation from Dr. Milton Pierce for hematochezia.  This 86-year-old woman has a history of diverticul
osis and chronic constipation.  She has a history also of sick sinus syndrome and atrial fibrillation
, and has a pacemaker.  She is also on chronic anticoagulation with warfarin.  She had presented to [facility] emergency department after having 1 episode of hematochezia.  She did strain on a very hard bowel 
movement.  She saw blood in the toilet water.  She had no associated abdominal pain, discomfort or li
ghtheadedness.  She has had a previous colonoscopy.  Previous colonoscopy was in May of 2011 by Dr. SANDRA lopez.  She had very significant left-sided diverticulosis on that exam and a small polyp.  She do
es have a history of chronic lower abdominal discomfort secondary to diverticulosis.  She has also ha
d a history of gastroesophageal reflux disease and has had a previous fundoplication.  She does have 
significant problems with constipation and does take MiraLAX as needed for constipation.  She denies 
any history of NSAID use.  She does have a history of chronic kidney disease.  She presented to the e
mergency department.  She was hemodynamically stable and had a normal hematocrit.  No obvious hemorrh
oids were reported per ER physician.  I was asked to see patient for further evaluation and patient h
as been admitted to the hospital for observation. She does have a family history of colon cancer in o
ne of her parents.



PAST MEDICAL HISTORY:  

1.  Remarkable for atrial fibrillation status post pacemaker, chronic anticoagulation and sick sinus 
syndrome, history of hypertension.

2.  Chronic kidney disease stage 3 or 4.

3.  GERD.

4.  History of peripheral neuropathy with chronic lower extremity pain.

5.  Right total knee and arthroscopy, bilateral total hip arthroscopy, cholecystectomy, previous fund
oplication.



MEDICATIONS PRIOR TO ADMISSION:  Tenormin, aspirin 81 mg daily, herbal supplements, warfarin, Xanax, 
Prolia subcutaneous, gabapentin, Norvasc, lisinopril and calcium carbonate.



SOCIAL HISTORY:  She lives with her  who is 90.  They live independently. She is a nonsmoker a
nd nondrinker.



ALLERGIES:  Hydrocodone, NSAIDs and IV contrast.



FAMILY HISTORY:  One of her parents had colon cancer



REVIEW OF SYSTEMS:  Negative 10 systems other than mentioned in HPI.



PHYSICAL EXAMINATION:  VITAL SIGNS: 153/83, pulse of 73, respiratory rate of 16, 97% saturation on 1 
L oxygen nasal cannula, 36.6 Celsius temperature. GENERAL: A very pleasant woman in no acute distress
.  HEENT:  Normocephalic, atraumatic.  EOMI.  NECK:  Supple.  No cervical adenopathy.  LUNGS:  Clear.
  CARDIAC: Exam irregularly irregular rhythm.  ABDOMEN:  Soft, normal bowel sounds.  No hepatosplenom
egaly.  Nontender. EXTREMITIES: Painful lower extremities without edema.  No clubbing or cyanosis.  S
KIN:  Warm, dry, and intact. NEUROLOGIC:  Nonfocal. PSYCHIATRIC: Alert oriented x3 with normal affect
.



LABORATORY DATA:  Hemoglobin of 15.0 with hematocrit of 43.7, PT of 27.5, INR of 2.56, serum sodium 1
39, potassium 4.0, chloride of 107, CO2 25, BUN of 17, creatinine is 1.0.



IMPRESSION:  

1.  86-year-old woman with history of diverticulosis and chronic constipation on chronic anticoagulat
ion.  She has had previous colonoscopy in 2011 with findings of significant diverticulosis.  The georgette
ent had associated rectal bleeding with straining of stool.  She has been hemodynamically stable with
out change in hematocrit.  Suspect anorectal source of bleeding in the setting of anticoagulation/Cou
madin.



RECOMMENDATIONS:  

1.  Observe serial H and H today in the hospital.  

2.  Clear liquid diet.  If stable, would advance as tolerated to a regular diet.

3.  Given her age and known history of diverticulosis, would not proceed with colonoscopy at this poi
nt.  Will follow clinically.  



Thank for allowing me to participate in the care of this patient.





Job #:  [id number]

## 2019-04-01 ENCOUNTER — HOSPITAL ENCOUNTER (EMERGENCY)
Dept: HOSPITAL 80 - FED | Age: 84
Discharge: HOME | End: 2019-04-01
Payer: COMMERCIAL

## 2019-04-01 VITALS — DIASTOLIC BLOOD PRESSURE: 98 MMHG | SYSTOLIC BLOOD PRESSURE: 145 MMHG

## 2019-04-01 DIAGNOSIS — R55: Primary | ICD-10-CM

## 2019-04-01 DIAGNOSIS — I48.91: ICD-10-CM

## 2019-04-01 DIAGNOSIS — Z95.0: ICD-10-CM

## 2019-04-01 LAB
INR PPP: 1.62 (ref 0.83–1.16)
PLATELET # BLD: 316 10^3/UL (ref 150–400)
PROTHROMBIN TIME: 18.5 SEC (ref 12–15)

## 2019-04-01 NOTE — EDPHY
H & P


Stated Complaint: pre syncopal





- Medical/Surgical History


Hx Asthma: No


Hx Chronic Respiratory Disease: No


Hx Diabetes: No


Hx Cardiac Disease: Yes


Hx Renal Disease: Yes


Hx Cirrhosis: No


Hx Alcoholism: No


Hx HIV/AIDS: No


Hx Splenectomy or Spleen Trauma: No


Other PMH: 1. HTN.  2. HYPERLIP.  3. AFIB status post PACEMAKER.  4. 

NEUROPATHY.  5. OSTEOSPOROSIS.  6. AUTONOMIC DYSFX.  7. CHRONIC KIDNEY DZ STG 4,

.  8. gall bladder removal.  9. fundiplacation.  10. GOUT.  11. DIVERTICULITIS.

  12. Orthopedic surgeries including a right hip arthroplasty and right knee 

arthroplasty.  13.Cataracts





- Social History


Smoking Status: Never smoked


Time Seen by Provider: 04/01/19 13:26


HPI/ROS: 





CHIEF COMPLAINT:  Syncope





HISTORY OF PRESENT ILLNESS:  87-year-old female with atrial fibrillation and 

pacemaker presents after a syncopal episode.  She was in the shower, started to 

feel lightheaded and sat down in a chair..  She then became diaphoretic and had 

a syncopal episode.  Her caregiver found her slumped back in the chair, 

unresponsive for a few moments.  She did not fall to the floor.  No chest pain, 

shortness of breath or headache.





REVIEW OF SYSTEMS:  complete 10 point ROS reviewed and is negative except for 

the noted elements in the HPI


 (Tabitha Delgado S)





- Physical Exam


Exam: 





General Appearance:  Alert, pleasant


Eyes:  Pupils equal and round, no conjunctival pallor


ENT, Mouth:  Mucous membranes moist


Neck:  Normal inspection


Respiratory:  Lungs are clear to auscultation


Cardiovascular:  Regular rate and rhythm


Gastrointestinal:  Abdomen is soft and nontender


Neurological:  A&O, nonfocal exam


Skin:  Warm and dry


Extremities:  Normal inspection


Psychiatric:  Mood and affect normal


 (Tabitha Delgado S)


Constitutional: 





 Initial Vital Signs











Temperature (C)  36.8 C   04/01/19 13:00


 


Heart Rate  70   04/01/19 13:00


 


Respiratory Rate  16   04/01/19 13:00


 


Blood Pressure  156/64 H  04/01/19 13:00


 


O2 Sat (%)  97   04/01/19 13:00








 











O2 Delivery Mode               Room Air














Allergies/Adverse Reactions: 


 





hydrocodone Allergy (Intermediate, Verified 04/01/19 13:02)


 Flushing


NSAIDS (Non-Steroidal Anti-Inflamma [NSAIDS (Non-Steroidal Anti-Inflammatory 

Drug)] Allergy (Unknown, Verified 04/01/19 13:02)


 RELATED TO KIDNEY DISEASE


IV Contrast Allergy (Uncoded 04/01/19 13:02)


 








Home Medications: 














 Medication  Instructions  Recorded


 


Aspirin [Aspirin 81mg (*)] 81 mg PO DAILY 08/23/14


 


Atenolol [Tenormin 50 mg (*)] 50 mg PO DAILY 08/23/14


 


Herbals/Supplements -Info Only 1 ea PO DAILY 12/27/14


 


Warfarin Sodium [Coumadin 5MG (*)] 5 mg PO MWF 12/27/14


 


ALPRAZolam [Xanax 0.5 MG (*)] 0.5 mg PO HS 04/20/16


 


Denosumab [Prolia] 60 mg SQ Q180D 04/20/16


 


Warfarin Sodium [Coumadin 2.5MG 2.5 mg PO SUTUTHSA@16 12/21/16





(*)]  


 


C/E/Zn/Cu/OM3/DHA/EPA/LUT/ZEAX 1 each PO DAILY@12 02/27/18





[Preservision Areds 2 Softgel]  


 


Gabapentin [Neurontin 300 MG (*)] 300 mg PO TID 02/27/18


 


amLODIPine BESYLATE [Norvasc 2.5 2.5 mg PO HS 02/27/18





mg (*)]  


 


Lisinopril [Zestril 10 mg (*)] 10 mg PO DAILY  tab 02/28/18


 


Calcium Carbonate [Calcium] 1,000 mg PO DAILY 03/14/18














Medical Decision Making


ED Course/Re-evaluation: 





This patient presents after a syncopal episode.  She is currently asymptomatic 

and neurologic exam is unremarkable.  Stat EKG reveals an atrial paced rhythm.  

Will have her pacemaker interrogated.  





1415: signed over to Dr. Avelar.  Plan to d/c home if pacemaker interrogation 

reveals no significant dysrhythmia.


 (Tabitha Delgado)





1535: I consulted with the pacemaker representative. She reports that the 

pacemaker interrogation was normal around the time of the syncopal event. This 

patient is safe to be discharged home.





1539: Reassessed patient and discussed pacemaker interrogation. Return 

precautions provided; patient is comfortable with this plan.


 (Bj Avelar)


Differential Diagnosis: 





Differential diagnosis includes though is not limited to cardiac dysrhythmia, 

CVA, TIA, GI bleed, sepsis, hypoglycemia. (Tabitha Delgado)





- Data Points


Laboratory Results: 





 Laboratory Results





 04/01/19 13:10 





 04/01/19 13:10 





 











  04/01/19 04/01/19 04/01/19





  13:12 13:10 13:10


 


WBC      





    


 


RBC      





    


 


Hgb      





    


 


Hct      





    


 


MCV      





    


 


MCH      





    


 


MCHC      





    


 


RDW      





    


 


Plt Count      





    


 


MPV      





    


 


Neut % (Auto)      





    


 


Lymph % (Auto)      





    


 


Mono % (Auto)      





    


 


Eos % (Auto)      





    


 


Baso % (Auto)      





    


 


Nucleat RBC Rel Count      





    


 


Absolute Neuts (auto)      





    


 


Absolute Lymphs (auto)      





    


 


Absolute Monos (auto)      





    


 


Absolute Eos (auto)      





    


 


Absolute Basos (auto)      





    


 


Absolute Nucleated RBC      





    


 


Immature Gran %      





    


 


Immature Gran #      





    


 


PT    18.5 SEC H SEC  





    (12.0-15.0)  


 


INR    1.62  H   





    (0.83-1.16)  


 


Sodium      137 mEq/L mEq/L





     (135-145) 


 


Potassium      4.1 mEq/L mEq/L





     (3.5-5.2) 


 


Chloride      101 mEq/L mEq/L





     () 


 


Carbon Dioxide      24 mEq/l mEq/l





     (22-31) 


 


Anion Gap      12 mEq/L mEq/L





     (6-14) 


 


BUN      19 mg/dL mg/dL





     (7-23) 


 


Creatinine      1.3 mg/dL H mg/dL





     (0.6-1.0) 


 


Estimated GFR      39 





    


 


Glucose      125 mg/dL H mg/dL





     () 


 


Calcium      9.6 mg/dL mg/dL





     (8.5-10.4) 


 


POC Troponin I  0.01 ng/mL ng/mL    





   (0.00-0.08)   














  04/01/19





  13:10


 


WBC  8.54 10^3/uL 10^3/uL





   (3.80-9.50) 


 


RBC  4.96 10^6/uL 10^6/uL





   (4.18-5.33) 


 


Hgb  14.5 g/dL g/dL





   (12.6-16.3) 


 


Hct  43.5 % %





   (38.0-47.0) 


 


MCV  87.7 fL fL





   (81.5-99.8) 


 


MCH  29.2 pg pg





   (27.9-34.1) 


 


MCHC  33.3 g/dL g/dL





   (32.4-36.7) 


 


RDW  14.0 % %





   (11.5-15.2) 


 


Plt Count  316 10^3/uL 10^3/uL





   (150-400) 


 


MPV  8.6 fL L fL





   (8.7-11.7) 


 


Neut % (Auto)  64.4 % %





   (39.3-74.2) 


 


Lymph % (Auto)  24.1 % %





   (15.0-45.0) 


 


Mono % (Auto)  8.3 % %





   (4.5-13.0) 


 


Eos % (Auto)  2.1 % %





   (0.6-7.6) 


 


Baso % (Auto)  0.7 % %





   (0.3-1.7) 


 


Nucleat RBC Rel Count  0.0 % %





   (0.0-0.2) 


 


Absolute Neuts (auto)  5.50 10^3/uL 10^3/uL





   (1.70-6.50) 


 


Absolute Lymphs (auto)  2.06 10^3/uL 10^3/uL





   (1.00-3.00) 


 


Absolute Monos (auto)  0.71 10^3/uL 10^3/uL





   (0.30-0.80) 


 


Absolute Eos (auto)  0.18 10^3/uL 10^3/uL





   (0.03-0.40) 


 


Absolute Basos (auto)  0.06 10^3/uL 10^3/uL





   (0.02-0.10) 


 


Absolute Nucleated RBC  0.00 10^3/uL 10^3/uL





   (0-0.01) 


 


Immature Gran %  0.4 % %





   (0.0-1.1) 


 


Immature Gran #  0.03 10^3/uL 10^3/uL





   (0.00-0.10) 


 


PT  





  


 


INR  





  


 


Sodium  





  


 


Potassium  





  


 


Chloride  





  


 


Carbon Dioxide  





  


 


Anion Gap  





  


 


BUN  





  


 


Creatinine  





  


 


Estimated GFR  





  


 


Glucose  





  


 


Calcium  





  


 


POC Troponin I  





  











Point of Care Test Results: 





 Chemistry











  04/01/19





  13:12


 


POC Troponin I  0.01 ng/mL ng/mL





   (0.00-0.08) 














Departure





- Departure


Disposition: Home, Routine, Self-Care


Clinical Impression: 


Syncope


Qualifiers:


 Syncope type: vasovagal syncope Qualified Code(s): R55 - Syncope and collapse





Condition: Good


Instructions:  Syncope (ED)


Additional Instructions: 


1. Follow-up with your primary care physician within 72 hours.  


2. Return to the emergency department immediately for headache, nausea, vomiting

, numbness, weakness, neck pain, fever or other concerns. 





Referrals: 


Grace Foss PA [Primary Care Provider] - 2-3 days, call for appt.

## 2019-04-08 NOTE — CPEKG
Test Reason : OPEN

Blood Pressure : ***/*** mmHG

Vent. Rate : 071 BPM     Atrial Rate : 128 BPM

   P-R Int : 211 ms          QRS Dur : 135 ms

    QT Int : 432 ms       P-R-T Axes : 030 -36 101 degrees

   QTc Int : 470 ms

 

Atrial-paced complexes

Left bundle branch block

 

Confirmed by Tabitha Delgado ([id number]) on 4/8/2019 8:23:32 AM

 

Referred By: Tabitha Delgado           Confirmed By:Tabitha Delgado